# Patient Record
Sex: MALE | Race: WHITE | NOT HISPANIC OR LATINO | Employment: OTHER | URBAN - METROPOLITAN AREA
[De-identification: names, ages, dates, MRNs, and addresses within clinical notes are randomized per-mention and may not be internally consistent; named-entity substitution may affect disease eponyms.]

---

## 2023-10-31 ENCOUNTER — TELEPHONE (OUTPATIENT)
Dept: PRIMARY CARE | Facility: CLINIC | Age: 78
End: 2023-10-31
Payer: MEDICARE

## 2023-11-01 DIAGNOSIS — E78.5 HYPERLIPIDEMIA, UNSPECIFIED HYPERLIPIDEMIA TYPE: ICD-10-CM

## 2023-11-01 DIAGNOSIS — E11.9 TYPE 2 DIABETES MELLITUS WITHOUT COMPLICATION, UNSPECIFIED WHETHER LONG TERM INSULIN USE (MULTI): ICD-10-CM

## 2023-11-01 DIAGNOSIS — I10 HYPERTENSION, UNSPECIFIED TYPE: ICD-10-CM

## 2023-11-01 DIAGNOSIS — Z00.00 WELL ADULT EXAM: ICD-10-CM

## 2023-12-27 ENCOUNTER — PATIENT MESSAGE (OUTPATIENT)
Dept: PRIMARY CARE | Facility: CLINIC | Age: 78
End: 2023-12-27
Payer: MEDICARE

## 2023-12-27 DIAGNOSIS — I10 ESSENTIAL HYPERTENSION: ICD-10-CM

## 2023-12-27 DIAGNOSIS — E11.9 TYPE 2 DIABETES MELLITUS WITHOUT COMPLICATION, WITHOUT LONG-TERM CURRENT USE OF INSULIN (MULTI): Primary | ICD-10-CM

## 2024-01-10 ENCOUNTER — LAB (OUTPATIENT)
Dept: LAB | Facility: LAB | Age: 79
End: 2024-01-10
Payer: MEDICARE

## 2024-01-10 DIAGNOSIS — I10 HYPERTENSION, UNSPECIFIED TYPE: ICD-10-CM

## 2024-01-10 DIAGNOSIS — E11.9 TYPE 2 DIABETES MELLITUS WITHOUT COMPLICATION, UNSPECIFIED WHETHER LONG TERM INSULIN USE (MULTI): ICD-10-CM

## 2024-01-10 DIAGNOSIS — Z00.00 WELL ADULT EXAM: ICD-10-CM

## 2024-01-10 DIAGNOSIS — E78.5 HYPERLIPIDEMIA, UNSPECIFIED HYPERLIPIDEMIA TYPE: ICD-10-CM

## 2024-01-10 LAB
ALBUMIN SERPL-MCNC: 4.5 G/DL (ref 3.5–5)
ALP BLD-CCNC: 89 U/L (ref 35–125)
ALT SERPL-CCNC: 33 U/L (ref 5–40)
ANION GAP SERPL CALC-SCNC: 11 MMOL/L
APPEARANCE UR: CLEAR
AST SERPL-CCNC: 25 U/L (ref 5–40)
BASOPHILS # BLD AUTO: 0.05 X10*3/UL (ref 0–0.1)
BASOPHILS NFR BLD AUTO: 0.8 %
BILIRUB SERPL-MCNC: 0.5 MG/DL (ref 0.1–1.2)
BILIRUB UR STRIP.AUTO-MCNC: NEGATIVE MG/DL
BUN SERPL-MCNC: 22 MG/DL (ref 8–25)
CALCIUM SERPL-MCNC: 9.3 MG/DL (ref 8.5–10.4)
CHLORIDE SERPL-SCNC: 104 MMOL/L (ref 97–107)
CHOLEST SERPL-MCNC: 190 MG/DL (ref 133–200)
CHOLEST/HDLC SERPL: 3.9 {RATIO}
CO2 SERPL-SCNC: 27 MMOL/L (ref 24–31)
COLOR UR: NORMAL
CREAT SERPL-MCNC: 1.2 MG/DL (ref 0.4–1.6)
CREAT UR-MCNC: 173.8 MG/DL
EGFRCR SERPLBLD CKD-EPI 2021: 63 ML/MIN/1.73M*2
EOSINOPHIL # BLD AUTO: 0.17 X10*3/UL (ref 0–0.4)
EOSINOPHIL NFR BLD AUTO: 2.8 %
ERYTHROCYTE [DISTWIDTH] IN BLOOD BY AUTOMATED COUNT: 13.2 % (ref 11.5–14.5)
EST. AVERAGE GLUCOSE BLD GHB EST-MCNC: 157 MG/DL
GLUCOSE SERPL-MCNC: 144 MG/DL (ref 65–99)
GLUCOSE UR STRIP.AUTO-MCNC: NORMAL MG/DL
HBA1C MFR BLD: 7.1 %
HCT VFR BLD AUTO: 44.3 % (ref 41–52)
HDLC SERPL-MCNC: 49 MG/DL
HGB BLD-MCNC: 14.4 G/DL (ref 13.5–17.5)
IMM GRANULOCYTES # BLD AUTO: 0.01 X10*3/UL (ref 0–0.5)
IMM GRANULOCYTES NFR BLD AUTO: 0.2 % (ref 0–0.9)
KETONES UR STRIP.AUTO-MCNC: NEGATIVE MG/DL
LDLC SERPL CALC-MCNC: 110 MG/DL (ref 65–130)
LEUKOCYTE ESTERASE UR QL STRIP.AUTO: NEGATIVE
LYMPHOCYTES # BLD AUTO: 2.81 X10*3/UL (ref 0.8–3)
LYMPHOCYTES NFR BLD AUTO: 46.9 %
MCH RBC QN AUTO: 30.7 PG (ref 26–34)
MCHC RBC AUTO-ENTMCNC: 32.5 G/DL (ref 32–36)
MCV RBC AUTO: 95 FL (ref 80–100)
MICROALBUMIN UR-MCNC: 32 MG/L (ref 0–23)
MICROALBUMIN/CREAT UR: 18.4 UG/MG CREAT
MONOCYTES # BLD AUTO: 0.45 X10*3/UL (ref 0.05–0.8)
MONOCYTES NFR BLD AUTO: 7.5 %
NEUTROPHILS # BLD AUTO: 2.5 X10*3/UL (ref 1.6–5.5)
NEUTROPHILS NFR BLD AUTO: 41.8 %
NITRITE UR QL STRIP.AUTO: NEGATIVE
NRBC BLD-RTO: 0 /100 WBCS (ref 0–0)
PH UR STRIP.AUTO: 5 [PH]
PLATELET # BLD AUTO: 262 X10*3/UL (ref 150–450)
POTASSIUM SERPL-SCNC: 4.3 MMOL/L (ref 3.4–5.1)
PROT SERPL-MCNC: 6.3 G/DL (ref 5.9–7.9)
PROT UR STRIP.AUTO-MCNC: NEGATIVE MG/DL
RBC # BLD AUTO: 4.69 X10*6/UL (ref 4.5–5.9)
RBC # UR STRIP.AUTO: NEGATIVE /UL
SODIUM SERPL-SCNC: 142 MMOL/L (ref 133–145)
SP GR UR STRIP.AUTO: 1.02
TRIGL SERPL-MCNC: 157 MG/DL (ref 40–150)
UROBILINOGEN UR STRIP.AUTO-MCNC: NORMAL MG/DL
WBC # BLD AUTO: 6 X10*3/UL (ref 4.4–11.3)

## 2024-01-10 PROCEDURE — 81003 URINALYSIS AUTO W/O SCOPE: CPT

## 2024-01-10 PROCEDURE — 82570 ASSAY OF URINE CREATININE: CPT

## 2024-01-10 PROCEDURE — 83036 HEMOGLOBIN GLYCOSYLATED A1C: CPT

## 2024-01-10 PROCEDURE — 36415 COLL VENOUS BLD VENIPUNCTURE: CPT

## 2024-01-10 PROCEDURE — 82043 UR ALBUMIN QUANTITATIVE: CPT

## 2024-01-10 PROCEDURE — 85025 COMPLETE CBC W/AUTO DIFF WBC: CPT

## 2024-01-10 PROCEDURE — 80053 COMPREHEN METABOLIC PANEL: CPT

## 2024-01-10 PROCEDURE — 80061 LIPID PANEL: CPT

## 2024-01-16 ENCOUNTER — TELEPHONE (OUTPATIENT)
Dept: PRIMARY CARE | Facility: CLINIC | Age: 79
End: 2024-01-16

## 2024-01-16 ENCOUNTER — OFFICE VISIT (OUTPATIENT)
Dept: PRIMARY CARE | Facility: CLINIC | Age: 79
End: 2024-01-16
Payer: MEDICARE

## 2024-01-16 VITALS
HEART RATE: 72 BPM | OXYGEN SATURATION: 98 % | WEIGHT: 172 LBS | BODY MASS INDEX: 26.94 KG/M2 | DIASTOLIC BLOOD PRESSURE: 76 MMHG | SYSTOLIC BLOOD PRESSURE: 134 MMHG

## 2024-01-16 DIAGNOSIS — E11.9 TYPE 2 DIABETES MELLITUS WITHOUT COMPLICATION, WITHOUT LONG-TERM CURRENT USE OF INSULIN (MULTI): ICD-10-CM

## 2024-01-16 DIAGNOSIS — I10 ESSENTIAL HYPERTENSION: ICD-10-CM

## 2024-01-16 DIAGNOSIS — E78.00 HIGH CHOLESTEROL: ICD-10-CM

## 2024-01-16 DIAGNOSIS — E78.5 HYPERLIPIDEMIA, UNSPECIFIED HYPERLIPIDEMIA TYPE: ICD-10-CM

## 2024-01-16 PROCEDURE — 1036F TOBACCO NON-USER: CPT | Performed by: STUDENT IN AN ORGANIZED HEALTH CARE EDUCATION/TRAINING PROGRAM

## 2024-01-16 PROCEDURE — 90677 PCV20 VACCINE IM: CPT | Performed by: STUDENT IN AN ORGANIZED HEALTH CARE EDUCATION/TRAINING PROGRAM

## 2024-01-16 PROCEDURE — 3075F SYST BP GE 130 - 139MM HG: CPT | Performed by: STUDENT IN AN ORGANIZED HEALTH CARE EDUCATION/TRAINING PROGRAM

## 2024-01-16 PROCEDURE — 1159F MED LIST DOCD IN RCRD: CPT | Performed by: STUDENT IN AN ORGANIZED HEALTH CARE EDUCATION/TRAINING PROGRAM

## 2024-01-16 PROCEDURE — G0009 ADMIN PNEUMOCOCCAL VACCINE: HCPCS | Performed by: STUDENT IN AN ORGANIZED HEALTH CARE EDUCATION/TRAINING PROGRAM

## 2024-01-16 PROCEDURE — 99214 OFFICE O/P EST MOD 30 MIN: CPT | Performed by: STUDENT IN AN ORGANIZED HEALTH CARE EDUCATION/TRAINING PROGRAM

## 2024-01-16 PROCEDURE — 3078F DIAST BP <80 MM HG: CPT | Performed by: STUDENT IN AN ORGANIZED HEALTH CARE EDUCATION/TRAINING PROGRAM

## 2024-01-16 PROCEDURE — 1126F AMNT PAIN NOTED NONE PRSNT: CPT | Performed by: STUDENT IN AN ORGANIZED HEALTH CARE EDUCATION/TRAINING PROGRAM

## 2024-01-16 RX ORDER — LISINOPRIL 40 MG/1
TABLET ORAL EVERY 24 HOURS
COMMUNITY
Start: 2013-06-01 | End: 2024-01-16 | Stop reason: SDUPTHER

## 2024-01-16 RX ORDER — HYDROCHLOROTHIAZIDE 25 MG/1
25 TABLET ORAL DAILY
Qty: 90 TABLET | Refills: 2 | Status: SHIPPED | OUTPATIENT
Start: 2024-01-16

## 2024-01-16 RX ORDER — PRAVASTATIN SODIUM 40 MG/1
TABLET ORAL
COMMUNITY
Start: 2013-06-01 | End: 2024-01-16 | Stop reason: SDUPTHER

## 2024-01-16 RX ORDER — GLIMEPIRIDE 2 MG/1
TABLET ORAL EVERY 24 HOURS
COMMUNITY
Start: 2013-06-01 | End: 2024-01-16 | Stop reason: SDUPTHER

## 2024-01-16 RX ORDER — GLIMEPIRIDE 2 MG/1
2 TABLET ORAL EVERY 24 HOURS
Qty: 90 TABLET | Refills: 2 | Status: SHIPPED | OUTPATIENT
Start: 2024-01-16

## 2024-01-16 RX ORDER — HYDROCHLOROTHIAZIDE 25 MG/1
25 TABLET ORAL DAILY
COMMUNITY
End: 2024-01-16 | Stop reason: SDUPTHER

## 2024-01-16 RX ORDER — PRAVASTATIN SODIUM 40 MG/1
TABLET ORAL
Qty: 90 TABLET | Refills: 2 | Status: SHIPPED | OUTPATIENT
Start: 2024-01-16

## 2024-01-16 RX ORDER — LISINOPRIL 40 MG/1
40 TABLET ORAL EVERY 24 HOURS
Qty: 90 TABLET | Refills: 2 | Status: SHIPPED | OUTPATIENT
Start: 2024-01-16

## 2024-01-16 ASSESSMENT — PAIN SCALES - GENERAL: PAINLEVEL: 0-NO PAIN

## 2024-01-16 ASSESSMENT — ENCOUNTER SYMPTOMS
DIZZINESS: 0
TROUBLE SWALLOWING: 0
CONSTIPATION: 0
CHILLS: 0
HEADACHES: 0
ARTHRALGIAS: 0
BACK PAIN: 0
WHEEZING: 0
FEVER: 0
BLOOD IN STOOL: 0
NERVOUS/ANXIOUS: 0
LIGHT-HEADEDNESS: 0
ABDOMINAL PAIN: 0
DIFFICULTY URINATING: 0
DIARRHEA: 0
PALPITATIONS: 0
DYSPHORIC MOOD: 0
COUGH: 0
JOINT SWELLING: 0
SHORTNESS OF BREATH: 0

## 2024-01-16 ASSESSMENT — PATIENT HEALTH QUESTIONNAIRE - PHQ9
2. FEELING DOWN, DEPRESSED OR HOPELESS: NOT AT ALL
1. LITTLE INTEREST OR PLEASURE IN DOING THINGS: NOT AT ALL
SUM OF ALL RESPONSES TO PHQ9 QUESTIONS 1 AND 2: 0

## 2024-01-16 NOTE — PROGRESS NOTES
GENERAL PROGRESS NOTE    Chief Complaint   Patient presents with    Diabetes       HPI  Jad Fisher is a 78 y.o. male that presents today for chronic disease management. Prior pt of Olivia Mason, this is our first meeting.     INTERVAL HX/CURRENT CONCERNS:  None    CHRONIC CONDITIONS:  DM  HTN  HLD - Pravastatin 40mg,     #HTN ck  BP today - 140/84, repeat  HCTZ 25, lisinopril 40      #DM ck  A1c - 7.1, 6.5, 7.1, 6.9, 6.4, 6.5, 6.7, 8.2, 7.8 6.5 6.5, 6.6  Was not watching what he was eating over holidays is getting more on track   Current treatment:  - Glimepiride 2mg  Home blood sugars - not checking  No hypoglycemia ever   Previous diarrhea with both IR and ER versions of metformin   eGFR - 63, 63, 57, 70, 63 70, 78 88, 78  Cr - 1.2, 1.2, 1.3, 1.1, 1.2, 1.1, 1.0, 0.9, 1.0   Last microalbumin - 6/23   Last eye exam - 10/23    Last foot exam - today  Pneumovax done? - 2015    Health Maintenance  Immunizations:     Tdap -     Pneumococcal - PCV13 2015, PPSV23 2020 per prior PCP notes, not documented in chart.  PCV 20 -- IF WILLING    Shingles - x2    COVID - 11/2023    Influenza - 11/2023  Colonoscopy: 2018, screening discontinue for age  Lung cancer screening: not indicated, never smoker  PSA - discontinued for age  AAA screen: never smoker      Vital signs   /76   Pulse 72   Wt 78 kg (172 lb)   SpO2 98%   BMI 26.94 kg/m²      Current Outpatient Medications   Medication Sig Dispense Refill    cinn-chrom-ALA-gym-ginseng-tea 100 mg-100 mcg- 100 mg-100 mg capsule daily      glimepiride (Amaryl) 2 mg tablet once every 24 hours.      hydroCHLOROthiazide (HYDRODiuril) 25 mg tablet Take 1 tablet (25 mg) by mouth once daily.      lisinopril 40 mg tablet once every 24 hours.      multivit with minerals/lutein (MULTIVITAMIN 50 PLUS ORAL) once every 24 hours.      pravastatin (Pravachol) 40  mg tablet 1 tab(s)       No current facility-administered medications for this visit.       Review of Systems   Constitutional:  Negative for chills and fever.   HENT:  Negative for trouble swallowing.    Eyes:  Negative for visual disturbance.   Respiratory:  Negative for cough, shortness of breath and wheezing.    Cardiovascular:  Negative for chest pain and palpitations.   Gastrointestinal:  Negative for abdominal pain, blood in stool, constipation and diarrhea.   Genitourinary:  Negative for difficulty urinating, penile pain and testicular pain.   Musculoskeletal:  Negative for arthralgias, back pain and joint swelling.   Skin:  Negative for rash.   Neurological:  Negative for dizziness, light-headedness and headaches.   Psychiatric/Behavioral:  Negative for dysphoric mood. The patient is not nervous/anxious.         Physical Exam  Constitutional:       Appearance: Normal appearance.   Cardiovascular:      Rate and Rhythm: Normal rate and regular rhythm.      Heart sounds: Normal heart sounds. No murmur heard.  Pulmonary:      Effort: Pulmonary effort is normal.      Breath sounds: Normal breath sounds. No wheezing, rhonchi or rales.   Musculoskeletal:      Right lower leg: No edema.      Left lower leg: No edema.   Neurological:      Mental Status: He is alert.       DIABETIC FOOT EXAM:  Skin changes - None  Pulses Present bilaterally to DP and PT  Nails - No fungal infections  Sensation in tact throughout with monifilament  No skin breaks between toes  No ulcers      Assessment/Plan   1. Essential hypertension  Well controlled on current meds, no indication to change any meds at this time  Stable renal function  Repeat in 6mo  - hydroCHLOROthiazide (HYDRODiuril) 25 mg tablet; Take 1 tablet (25 mg) by mouth once daily.  Dispense: 90 tablet; Refill: 2  - lisinopril 40 mg tablet; Take 1 tablet (40 mg) by mouth once every 24 hours.  Dispense: 90 tablet; Refill: 2    2. Type 2 diabetes mellitus without  complication, without long-term current use of insulin (CMS/[facility])  Labs reviewed. Increase in A1c from prior in setting of dietary noncompliance. Will increase his efforts. No medication changes today. Follow up 4 mos  - glimepiride (Amaryl) 2 mg tablet; Take 1 tablet (2 mg) by mouth once every 24 hours.  Dispense: 90 tablet; Refill: 2    3. High cholesterol  Lipid panel reviewed. Continue medication. Low fat diet. Regular exercise.  - pravastatin (Pravachol) 40 mg tablet; 1 tab(s)  Dispense: 90 tablet; Refill: 2      Dolores Alexis MD  01/16/24  9:07 AM

## 2024-06-03 ENCOUNTER — LAB (OUTPATIENT)
Dept: LAB | Facility: LAB | Age: 79
End: 2024-06-03
Payer: MEDICARE

## 2024-06-03 DIAGNOSIS — I10 ESSENTIAL HYPERTENSION: ICD-10-CM

## 2024-06-03 DIAGNOSIS — E11.9 TYPE 2 DIABETES MELLITUS WITHOUT COMPLICATION, WITHOUT LONG-TERM CURRENT USE OF INSULIN (MULTI): ICD-10-CM

## 2024-06-03 DIAGNOSIS — E78.5 HYPERLIPIDEMIA, UNSPECIFIED HYPERLIPIDEMIA TYPE: ICD-10-CM

## 2024-06-03 LAB
ALBUMIN SERPL-MCNC: 4.6 G/DL (ref 3.5–5)
ALP BLD-CCNC: 97 U/L (ref 35–125)
ALT SERPL-CCNC: 23 U/L (ref 5–40)
ANION GAP SERPL CALC-SCNC: 11 MMOL/L
APPEARANCE UR: CLEAR
AST SERPL-CCNC: 22 U/L (ref 5–40)
BASOPHILS # BLD AUTO: 0.04 X10*3/UL (ref 0–0.1)
BASOPHILS NFR BLD AUTO: 0.6 %
BILIRUB SERPL-MCNC: 0.3 MG/DL (ref 0.1–1.2)
BILIRUB UR STRIP.AUTO-MCNC: NEGATIVE MG/DL
BUN SERPL-MCNC: 24 MG/DL (ref 8–25)
CALCIUM SERPL-MCNC: 9.6 MG/DL (ref 8.5–10.4)
CHLORIDE SERPL-SCNC: 104 MMOL/L (ref 97–107)
CHOLEST SERPL-MCNC: 183 MG/DL (ref 133–200)
CHOLEST/HDLC SERPL: 3.4 {RATIO}
CO2 SERPL-SCNC: 26 MMOL/L (ref 24–31)
COLOR UR: NORMAL
CREAT SERPL-MCNC: 1.1 MG/DL (ref 0.4–1.6)
EGFRCR SERPLBLD CKD-EPI 2021: 69 ML/MIN/1.73M*2
EOSINOPHIL # BLD AUTO: 0.14 X10*3/UL (ref 0–0.4)
EOSINOPHIL NFR BLD AUTO: 2.2 %
ERYTHROCYTE [DISTWIDTH] IN BLOOD BY AUTOMATED COUNT: 12.6 % (ref 11.5–14.5)
EST. AVERAGE GLUCOSE BLD GHB EST-MCNC: 143 MG/DL
GLUCOSE SERPL-MCNC: 171 MG/DL (ref 65–99)
GLUCOSE UR STRIP.AUTO-MCNC: NORMAL MG/DL
HBA1C MFR BLD: 6.6 %
HCT VFR BLD AUTO: 42.6 % (ref 41–52)
HDLC SERPL-MCNC: 54 MG/DL
HGB BLD-MCNC: 14 G/DL (ref 13.5–17.5)
IMM GRANULOCYTES # BLD AUTO: 0.02 X10*3/UL (ref 0–0.5)
IMM GRANULOCYTES NFR BLD AUTO: 0.3 % (ref 0–0.9)
KETONES UR STRIP.AUTO-MCNC: NEGATIVE MG/DL
LDLC SERPL CALC-MCNC: 96 MG/DL (ref 65–130)
LEUKOCYTE ESTERASE UR QL STRIP.AUTO: NEGATIVE
LYMPHOCYTES # BLD AUTO: 2 X10*3/UL (ref 0.8–3)
LYMPHOCYTES NFR BLD AUTO: 31.1 %
MCH RBC QN AUTO: 30.6 PG (ref 26–34)
MCHC RBC AUTO-ENTMCNC: 32.9 G/DL (ref 32–36)
MCV RBC AUTO: 93 FL (ref 80–100)
MONOCYTES # BLD AUTO: 0.47 X10*3/UL (ref 0.05–0.8)
MONOCYTES NFR BLD AUTO: 7.3 %
NEUTROPHILS # BLD AUTO: 3.77 X10*3/UL (ref 1.6–5.5)
NEUTROPHILS NFR BLD AUTO: 58.5 %
NITRITE UR QL STRIP.AUTO: NEGATIVE
NRBC BLD-RTO: 0 /100 WBCS (ref 0–0)
PH UR STRIP.AUTO: 6 [PH]
PLATELET # BLD AUTO: 260 X10*3/UL (ref 150–450)
POTASSIUM SERPL-SCNC: 4.6 MMOL/L (ref 3.4–5.1)
PROT SERPL-MCNC: 6.6 G/DL (ref 5.9–7.9)
PROT UR STRIP.AUTO-MCNC: NEGATIVE MG/DL
RBC # BLD AUTO: 4.57 X10*6/UL (ref 4.5–5.9)
RBC # UR STRIP.AUTO: NEGATIVE /UL
SODIUM SERPL-SCNC: 141 MMOL/L (ref 133–145)
SP GR UR STRIP.AUTO: 1.02
TRIGL SERPL-MCNC: 167 MG/DL (ref 40–150)
UROBILINOGEN UR STRIP.AUTO-MCNC: NORMAL MG/DL
WBC # BLD AUTO: 6.4 X10*3/UL (ref 4.4–11.3)

## 2024-06-03 PROCEDURE — 80053 COMPREHEN METABOLIC PANEL: CPT

## 2024-06-03 PROCEDURE — 83036 HEMOGLOBIN GLYCOSYLATED A1C: CPT

## 2024-06-03 PROCEDURE — 36415 COLL VENOUS BLD VENIPUNCTURE: CPT

## 2024-06-03 PROCEDURE — 85025 COMPLETE CBC W/AUTO DIFF WBC: CPT

## 2024-06-03 PROCEDURE — 81003 URINALYSIS AUTO W/O SCOPE: CPT

## 2024-06-03 PROCEDURE — 80061 LIPID PANEL: CPT

## 2024-06-10 ENCOUNTER — TELEPHONE (OUTPATIENT)
Dept: PRIMARY CARE | Facility: CLINIC | Age: 79
End: 2024-06-10

## 2024-06-10 ENCOUNTER — OFFICE VISIT (OUTPATIENT)
Dept: PRIMARY CARE | Facility: CLINIC | Age: 79
End: 2024-06-10
Payer: MEDICARE

## 2024-06-10 VITALS
HEIGHT: 67 IN | TEMPERATURE: 97.7 F | SYSTOLIC BLOOD PRESSURE: 140 MMHG | HEART RATE: 61 BPM | DIASTOLIC BLOOD PRESSURE: 82 MMHG | WEIGHT: 168 LBS | OXYGEN SATURATION: 96 % | BODY MASS INDEX: 26.37 KG/M2

## 2024-06-10 DIAGNOSIS — Z00.00 ROUTINE GENERAL MEDICAL EXAMINATION AT A HEALTH CARE FACILITY: Primary | ICD-10-CM

## 2024-06-10 DIAGNOSIS — Z85.46 HISTORY OF PROSTATE CANCER: ICD-10-CM

## 2024-06-10 DIAGNOSIS — E11.9 TYPE 2 DIABETES MELLITUS WITHOUT COMPLICATION, WITHOUT LONG-TERM CURRENT USE OF INSULIN (MULTI): ICD-10-CM

## 2024-06-10 DIAGNOSIS — E78.5 HYPERLIPIDEMIA, UNSPECIFIED HYPERLIPIDEMIA TYPE: ICD-10-CM

## 2024-06-10 DIAGNOSIS — I10 ESSENTIAL HYPERTENSION: ICD-10-CM

## 2024-06-10 PROCEDURE — 3077F SYST BP >= 140 MM HG: CPT | Performed by: STUDENT IN AN ORGANIZED HEALTH CARE EDUCATION/TRAINING PROGRAM

## 2024-06-10 PROCEDURE — 93000 ELECTROCARDIOGRAM COMPLETE: CPT | Performed by: STUDENT IN AN ORGANIZED HEALTH CARE EDUCATION/TRAINING PROGRAM

## 2024-06-10 PROCEDURE — 1158F ADVNC CARE PLAN TLK DOCD: CPT | Performed by: STUDENT IN AN ORGANIZED HEALTH CARE EDUCATION/TRAINING PROGRAM

## 2024-06-10 PROCEDURE — 1123F ACP DISCUSS/DSCN MKR DOCD: CPT | Performed by: STUDENT IN AN ORGANIZED HEALTH CARE EDUCATION/TRAINING PROGRAM

## 2024-06-10 PROCEDURE — 3078F DIAST BP <80 MM HG: CPT | Performed by: STUDENT IN AN ORGANIZED HEALTH CARE EDUCATION/TRAINING PROGRAM

## 2024-06-10 PROCEDURE — 99214 OFFICE O/P EST MOD 30 MIN: CPT | Performed by: STUDENT IN AN ORGANIZED HEALTH CARE EDUCATION/TRAINING PROGRAM

## 2024-06-10 PROCEDURE — 1160F RVW MEDS BY RX/DR IN RCRD: CPT | Performed by: STUDENT IN AN ORGANIZED HEALTH CARE EDUCATION/TRAINING PROGRAM

## 2024-06-10 PROCEDURE — 1036F TOBACCO NON-USER: CPT | Performed by: STUDENT IN AN ORGANIZED HEALTH CARE EDUCATION/TRAINING PROGRAM

## 2024-06-10 PROCEDURE — G0439 PPPS, SUBSEQ VISIT: HCPCS | Performed by: STUDENT IN AN ORGANIZED HEALTH CARE EDUCATION/TRAINING PROGRAM

## 2024-06-10 PROCEDURE — 1170F FXNL STATUS ASSESSED: CPT | Performed by: STUDENT IN AN ORGANIZED HEALTH CARE EDUCATION/TRAINING PROGRAM

## 2024-06-10 PROCEDURE — 1159F MED LIST DOCD IN RCRD: CPT | Performed by: STUDENT IN AN ORGANIZED HEALTH CARE EDUCATION/TRAINING PROGRAM

## 2024-06-10 ASSESSMENT — ENCOUNTER SYMPTOMS
CHILLS: 0
OCCASIONAL FEELINGS OF UNSTEADINESS: 0
JOINT SWELLING: 0
NERVOUS/ANXIOUS: 0
LIGHT-HEADEDNESS: 0
ABDOMINAL PAIN: 0
ARTHRALGIAS: 0
PALPITATIONS: 0
DIFFICULTY URINATING: 0
FEVER: 0
LOSS OF SENSATION IN FEET: 0
HEADACHES: 0
DIZZINESS: 0
CONSTIPATION: 0
TROUBLE SWALLOWING: 0
WHEEZING: 0
DIARRHEA: 0
BACK PAIN: 0
COUGH: 0
SHORTNESS OF BREATH: 0
DYSPHORIC MOOD: 0
BLOOD IN STOOL: 0
DEPRESSION: 0

## 2024-06-10 ASSESSMENT — ACTIVITIES OF DAILY LIVING (ADL)
BATHING: INDEPENDENT
MANAGING_FINANCES: INDEPENDENT
GROCERY_SHOPPING: INDEPENDENT
DRESSING: INDEPENDENT
DOING_HOUSEWORK: INDEPENDENT
TAKING_MEDICATION: INDEPENDENT

## 2024-06-10 ASSESSMENT — PATIENT HEALTH QUESTIONNAIRE - PHQ9
1. LITTLE INTEREST OR PLEASURE IN DOING THINGS: NOT AT ALL
2. FEELING DOWN, DEPRESSED OR HOPELESS: NOT AT ALL
SUM OF ALL RESPONSES TO PHQ9 QUESTIONS 1 AND 2: 0

## 2024-06-10 NOTE — PROGRESS NOTES
Subjective   Reason for Visit: Jad Fisher is an 79 y.o. male here for a Medicare Wellness visit.          Reviewed all medications by prescribing practitioner or clinical pharmacist (such as prescriptions, OTCs, herbal therapies and supplements) and documented in the medical record.    HPI  INTERVAL HX/CURRENT CONCERNS: none    CHRONIC CONDITIONS:  DM  HTN  HLD - Pravastatin 40mg, LDL 96  Hx prostate ca s/p brachytherapy 2017, under urology. Will be establishing with new urologist 7/2024 due to Dr. Miranda FDC    Supplements - MVI, cinnamon, fish oil    SPECIALISTS -   Urology - Dr. Miranda       Exercise - not really aside from housework, manages two households   Diet - good  Retired -   EtOH - one drink every 2-3 days    Current Outpatient Medications   Medication Instructions    cinn-chrom-ALA-gym-ginseng-tea 100 mg-100 mcg- 100 mg-100 mg capsule daily    glimepiride (AMARYL) 2 mg, oral, Every 24 hours    hydroCHLOROthiazide (HYDRODIURIL) 25 mg, oral, Daily    lisinopril 40 mg, oral, Every 24 hours    multivit with minerals/lutein (MULTIVITAMIN 50 PLUS ORAL) Every 24 hours    pravastatin (Pravachol) 40 mg tablet 1 tab(s)     Allergies   Allergen Reactions    Sildenafil Unknown       Immunization History   Administered Date(s) Administered    Flu vaccine, quadrivalent, high-dose, preservative free, age 65y+ (FLUZONE) 10/08/2020, 10/26/2021, 11/08/2022    Influenza, High Dose Seasonal, Preservative Free 11/02/2015, 10/14/2016, 09/05/2017, 10/17/2018    Influenza, Seasonal, Quadrivalent, Adjuvanted 11/08/2023    Influenza, injectable, quadrivalent 10/16/2019    Influenza, seasonal, injectable 10/01/2016    Influenza, seasonal, injectable, preservative free 10/21/2011, 10/01/2014, 10/01/2015    Moderna SARS-CoV-2 Vaccination 02/18/2021, 03/17/2021, 11/09/2021    Pfizer COVID-19 vaccine, Fall 2023, 12 years and older, (30mcg/0.3mL) 11/08/2023    Pfizer COVID-19 vaccine, bivalent, age 12 years and  older (30 mcg/0.3 mL) 11/16/2022    Pneumococcal conjugate vaccine, 13-valent (PREVNAR 13) 03/02/2015, 01/01/2018    Pneumococcal conjugate vaccine, 20-valent (PREVNAR 20) 01/16/2024    Pneumococcal polysaccharide vaccine, 23-valent, age 2 years and older (PNEUMOVAX 23) 03/26/2012    Tdap vaccine, age 7 year and older (BOOSTRIX, ADACEL) 11/07/2008, 01/01/2016    Zoster, live 01/01/2014, 06/11/2014     Past Surgical History:   Procedure Laterality Date    CARPAL TUNNEL RELEASE  04/04/2017    Neuroplasty Decompression Median Nerve At Carpal Tunnel    COLONOSCOPY  04/16/2019    Complete Colonoscopy    OTHER SURGICAL HISTORY  04/09/2018    Needle/Cath Placement For Brachyther Applic In Pelvic Organs    OTHER SURGICAL HISTORY  03/24/2014    Open Treat Weight Bearing Distal Tibial & Fibular Fracture    TONSILLECTOMY  03/24/2014    Tonsillectomy     Family History   Problem Relation Name Age of Onset    Cancer Mother      Diabetes Father      Heart disease Father      Other (high blood pressure) Father      Hyperlipidemia Father       Social History     Socioeconomic History    Marital status:      Spouse name: None    Number of children: None    Years of education: None    Highest education level: None   Occupational History    None   Tobacco Use    Smoking status: Never    Smokeless tobacco: Never   Vaping Use    Vaping status: Never Used   Substance and Sexual Activity    Alcohol use: Yes    Drug use: Never    Sexual activity: None   Other Topics Concern    None   Social History Narrative    None     Social Determinants of Health     Financial Resource Strain: Not on file   Food Insecurity: Not on file   Transportation Needs: Not on file   Physical Activity: Not on file   Stress: Not on file   Social Connections: Not on file   Intimate Partner Violence: Not on file   Housing Stability: Not on file         #HTN   BP today - 144/75  HCTZ 25, lisinopril 40  Home readings - 130-140/70's. Not checking often      #DM  "  A1c - 6.6, 7.1, 6.5, 7.1, 6.9, 6.4, 6.5, 6.7, 8.2, 7.8 6.5 6.5, 6.6   Current treatment:  - Glimepiride 2mg daily  Home blood sugars - not checking regularly. Has checked in past week, and non-fasting is 130-140's  No hypoglycemia ever   Previous diarrhea with both IR and ER versions of metformin   eGFR - 69, 63, 63, 57, 70, 63 70, 78 88, 78  Cr - 1.1, 1.2, 1.2, 1.3, 1.1, 1.2, 1.1, 1.0, 0.9, 1.0   Last microalbumin - 1/24   Last eye exam - 10/23    Last foot exam - 1/24      Health Maintenance  Immunizations:     Tdap - 2016    Pneumococcal - PCV20 1/24    Shingles - Zostavax 2014, shingrix done x 2 at [facility] per pt    COVID - last dose 11/2023    Influenza - annually  Colonoscopy: 2018, now discontinued for age  Lung cancer screening: not indicated    Male Specific   PSA last done - 7/23 with urology w/ hx prostate ca   AAA screen: never smoker    Patient Care Team:  Dolores Alexis MD as PCP - General (Family Medicine)     Review of Systems   Constitutional:  Negative for chills and fever.   HENT:  Negative for trouble swallowing.    Eyes:  Negative for visual disturbance.   Respiratory:  Negative for cough, shortness of breath and wheezing.    Cardiovascular:  Negative for chest pain and palpitations.   Gastrointestinal:  Negative for abdominal pain, blood in stool, constipation and diarrhea.   Genitourinary:  Negative for difficulty urinating, penile pain and testicular pain.   Musculoskeletal:  Negative for arthralgias, back pain and joint swelling.   Skin:  Negative for rash.   Neurological:  Negative for dizziness, light-headedness and headaches.   Psychiatric/Behavioral:  Negative for dysphoric mood. The patient is not nervous/anxious.        Objective   Vitals:  /75 (BP Location: Left arm, Patient Position: Sitting, BP Cuff Size: Adult)   Pulse 61   Temp 36.5 °C (97.7 °F) (Temporal)   Ht 1.702 m (5' 7\")   Wt 76.2 kg (168 lb)   SpO2 96%   BMI 26.31 kg/m²       Physical Exam  Vitals and nursing note " reviewed.   Constitutional:       Appearance: Normal appearance.   HENT:      Head: Normocephalic and atraumatic.      Right Ear: Tympanic membrane, ear canal and external ear normal.      Left Ear: Tympanic membrane, ear canal and external ear normal.      Mouth/Throat:      Mouth: Mucous membranes are moist.      Pharynx: Oropharynx is clear.   Eyes:      Extraocular Movements: Extraocular movements intact.      Conjunctiva/sclera: Conjunctivae normal.      Pupils: Pupils are equal, round, and reactive to light.   Cardiovascular:      Rate and Rhythm: Normal rate and regular rhythm.      Heart sounds: Normal heart sounds. No murmur heard.  Pulmonary:      Effort: Pulmonary effort is normal.      Breath sounds: Normal breath sounds. No wheezing, rhonchi or rales.   Abdominal:      General: Abdomen is flat.      Palpations: Abdomen is soft.      Tenderness: There is no abdominal tenderness.   Musculoskeletal:         General: Normal range of motion.      Cervical back: Normal range of motion and neck supple.   Skin:     General: Skin is warm and dry.   Neurological:      Mental Status: He is alert.   Psychiatric:         Mood and Affect: Mood normal.         Behavior: Behavior normal.         Assessment/Plan   1. Routine general medical examination at a health care facility  Well adult exam.  1. Age appropriate preventative measures reviewed.   2. Encouraged healthy diet and exercise.  3. Immunizations- Reviewed and discussed  4. Labs- Reviewed and discussed with patient  5. Medications- Reviewed  - ECG 12 Lead    2. Essential hypertension  Slight elevation today. Begin checking BP at home 2-3x weekly. He will report any persistently elevated readings. No medication changes made today. Discussed DASH diet, regular exercise. Follow up 4 mos.     3. Type 2 diabetes mellitus without complication, without long-term current use of insulin (Multi)  Controlled  Continue current medication regimen.   Patient is on ACE, and  statin  Lipid panel checked in last year  Foot exam and monofilament UTD  Routine diabetic retinopathy screening up to date   Continue dietary efforts and physical activity      4. Hyperlipidemia, unspecified hyperlipidemia type  Lipid panel reviewed. Continue medication. Low fat diet. Regular exercise.    5. History of prostate cancer  Follow up with urology as scheduled

## 2024-10-10 ENCOUNTER — LAB (OUTPATIENT)
Dept: LAB | Facility: LAB | Age: 79
End: 2024-10-10
Payer: MEDICARE

## 2024-10-10 DIAGNOSIS — E11.9 TYPE 2 DIABETES MELLITUS WITHOUT COMPLICATION, WITHOUT LONG-TERM CURRENT USE OF INSULIN (MULTI): ICD-10-CM

## 2024-10-10 DIAGNOSIS — I10 ESSENTIAL HYPERTENSION: ICD-10-CM

## 2024-10-10 LAB
ALBUMIN SERPL BCP-MCNC: 4.5 G/DL (ref 3.4–5)
ALP SERPL-CCNC: 76 U/L (ref 33–136)
ALT SERPL W P-5'-P-CCNC: 21 U/L (ref 10–52)
ANION GAP SERPL CALCULATED.3IONS-SCNC: 9 MMOL/L (ref 10–20)
APPEARANCE UR: CLEAR
AST SERPL W P-5'-P-CCNC: 23 U/L (ref 9–39)
BILIRUB SERPL-MCNC: 0.5 MG/DL (ref 0–1.2)
BILIRUB UR STRIP.AUTO-MCNC: NEGATIVE MG/DL
BUN SERPL-MCNC: 17 MG/DL (ref 6–23)
CALCIUM SERPL-MCNC: 9.7 MG/DL (ref 8.6–10.3)
CHLORIDE SERPL-SCNC: 106 MMOL/L (ref 98–107)
CO2 SERPL-SCNC: 30 MMOL/L (ref 21–32)
COLOR UR: NORMAL
CREAT SERPL-MCNC: 1.01 MG/DL (ref 0.5–1.3)
CREAT UR-MCNC: 88 MG/DL (ref 20–370)
EGFRCR SERPLBLD CKD-EPI 2021: 77 ML/MIN/1.73M*2
GLUCOSE SERPL-MCNC: 86 MG/DL (ref 74–99)
GLUCOSE UR STRIP.AUTO-MCNC: NORMAL MG/DL
KETONES UR STRIP.AUTO-MCNC: NEGATIVE MG/DL
LEUKOCYTE ESTERASE UR QL STRIP.AUTO: NEGATIVE
MICROALBUMIN UR-MCNC: <7 MG/L
MICROALBUMIN/CREAT UR: NORMAL MG/G{CREAT}
NITRITE UR QL STRIP.AUTO: NEGATIVE
PH UR STRIP.AUTO: 6 [PH]
POTASSIUM SERPL-SCNC: 4.2 MMOL/L (ref 3.5–5.3)
PROT SERPL-MCNC: 6.4 G/DL (ref 6.4–8.2)
PROT UR STRIP.AUTO-MCNC: NEGATIVE MG/DL
RBC # UR STRIP.AUTO: NEGATIVE /UL
SODIUM SERPL-SCNC: 141 MMOL/L (ref 136–145)
SP GR UR STRIP.AUTO: 1.02
UROBILINOGEN UR STRIP.AUTO-MCNC: NORMAL MG/DL

## 2024-10-10 PROCEDURE — 82043 UR ALBUMIN QUANTITATIVE: CPT

## 2024-10-10 PROCEDURE — 83036 HEMOGLOBIN GLYCOSYLATED A1C: CPT

## 2024-10-10 PROCEDURE — 81003 URINALYSIS AUTO W/O SCOPE: CPT

## 2024-10-10 PROCEDURE — 82570 ASSAY OF URINE CREATININE: CPT

## 2024-10-10 PROCEDURE — 80053 COMPREHEN METABOLIC PANEL: CPT

## 2024-10-11 LAB
EST. AVERAGE GLUCOSE BLD GHB EST-MCNC: 126 MG/DL
HBA1C MFR BLD: 6 %

## 2024-10-17 ENCOUNTER — TELEPHONE (OUTPATIENT)
Dept: PRIMARY CARE | Facility: CLINIC | Age: 79
End: 2024-10-17

## 2024-10-17 ENCOUNTER — APPOINTMENT (OUTPATIENT)
Dept: PRIMARY CARE | Facility: CLINIC | Age: 79
End: 2024-10-17
Payer: MEDICARE

## 2024-10-17 VITALS
OXYGEN SATURATION: 98 % | WEIGHT: 167 LBS | BODY MASS INDEX: 26.21 KG/M2 | DIASTOLIC BLOOD PRESSURE: 80 MMHG | HEART RATE: 65 BPM | HEIGHT: 67 IN | SYSTOLIC BLOOD PRESSURE: 142 MMHG

## 2024-10-17 DIAGNOSIS — E11.9 TYPE 2 DIABETES MELLITUS WITHOUT COMPLICATION, WITHOUT LONG-TERM CURRENT USE OF INSULIN (MULTI): ICD-10-CM

## 2024-10-17 DIAGNOSIS — E78.00 HIGH CHOLESTEROL: ICD-10-CM

## 2024-10-17 DIAGNOSIS — I10 ESSENTIAL HYPERTENSION: ICD-10-CM

## 2024-10-17 DIAGNOSIS — I10 ESSENTIAL HYPERTENSION: Primary | ICD-10-CM

## 2024-10-17 PROBLEM — F43.21 GRIEF REACTION: Status: ACTIVE | Noted: 2020-07-23

## 2024-10-17 PROBLEM — R42 DIZZINESS: Status: ACTIVE | Noted: 2020-09-03

## 2024-10-17 PROBLEM — G56.03 SEVERE CARPAL TUNNEL SYNDROME OF BOTH WRISTS: Status: ACTIVE | Noted: 2024-10-17

## 2024-10-17 PROBLEM — G56.22 CUBITAL TUNNEL SYNDROME ON LEFT: Status: ACTIVE | Noted: 2024-10-17

## 2024-10-17 PROBLEM — N52.9 MALE ERECTILE DISORDER: Status: ACTIVE | Noted: 2024-10-17

## 2024-10-17 PROBLEM — N40.0 BENIGN PROSTATIC HYPERPLASIA: Status: ACTIVE | Noted: 2024-10-17

## 2024-10-17 PROBLEM — Z86.0101 HISTORY OF ADENOMATOUS POLYP OF COLON: Status: ACTIVE | Noted: 2024-10-17

## 2024-10-17 PROBLEM — R33.9 RETENTION OF URINE: Status: ACTIVE | Noted: 2024-10-17

## 2024-10-17 PROBLEM — R19.7 DIARRHEA: Status: RESOLVED | Noted: 2020-09-03 | Resolved: 2024-10-17

## 2024-10-17 PROBLEM — E78.5 HYPERLIPIDEMIA: Status: ACTIVE | Noted: 2020-09-03

## 2024-10-17 PROBLEM — C61 ADENOCARCINOMA OF PROSTATE (MULTI): Status: ACTIVE | Noted: 2019-08-06

## 2024-10-17 PROBLEM — F43.20 GRIEF REACTION: Status: ACTIVE | Noted: 2020-07-23

## 2024-10-17 PROBLEM — R20.2 PARESTHESIA OF SKIN: Status: RESOLVED | Noted: 2024-10-17 | Resolved: 2024-10-17

## 2024-10-17 PROCEDURE — 1159F MED LIST DOCD IN RCRD: CPT | Performed by: STUDENT IN AN ORGANIZED HEALTH CARE EDUCATION/TRAINING PROGRAM

## 2024-10-17 PROCEDURE — 99214 OFFICE O/P EST MOD 30 MIN: CPT | Performed by: STUDENT IN AN ORGANIZED HEALTH CARE EDUCATION/TRAINING PROGRAM

## 2024-10-17 PROCEDURE — 1123F ACP DISCUSS/DSCN MKR DOCD: CPT | Performed by: STUDENT IN AN ORGANIZED HEALTH CARE EDUCATION/TRAINING PROGRAM

## 2024-10-17 PROCEDURE — 1036F TOBACCO NON-USER: CPT | Performed by: STUDENT IN AN ORGANIZED HEALTH CARE EDUCATION/TRAINING PROGRAM

## 2024-10-17 PROCEDURE — 3077F SYST BP >= 140 MM HG: CPT | Performed by: STUDENT IN AN ORGANIZED HEALTH CARE EDUCATION/TRAINING PROGRAM

## 2024-10-17 PROCEDURE — 3079F DIAST BP 80-89 MM HG: CPT | Performed by: STUDENT IN AN ORGANIZED HEALTH CARE EDUCATION/TRAINING PROGRAM

## 2024-10-17 PROCEDURE — G2211 COMPLEX E/M VISIT ADD ON: HCPCS | Performed by: STUDENT IN AN ORGANIZED HEALTH CARE EDUCATION/TRAINING PROGRAM

## 2024-10-17 RX ORDER — PRAVASTATIN SODIUM 40 MG/1
TABLET ORAL
Qty: 90 TABLET | Refills: 2 | Status: SHIPPED | OUTPATIENT
Start: 2024-10-17

## 2024-10-17 RX ORDER — GLIMEPIRIDE 2 MG/1
2 TABLET ORAL EVERY 24 HOURS
Qty: 90 TABLET | Refills: 2 | Status: SHIPPED | OUTPATIENT
Start: 2024-10-17

## 2024-10-17 RX ORDER — AMLODIPINE BESYLATE 2.5 MG/1
2.5 TABLET ORAL DAILY
Qty: 90 TABLET | Refills: 1 | Status: SHIPPED | OUTPATIENT
Start: 2024-10-17 | End: 2025-04-15

## 2024-10-17 RX ORDER — HYDROCHLOROTHIAZIDE 25 MG/1
25 TABLET ORAL DAILY
Qty: 90 TABLET | Refills: 2 | Status: SHIPPED | OUTPATIENT
Start: 2024-10-17

## 2024-10-17 RX ORDER — LISINOPRIL 40 MG/1
40 TABLET ORAL EVERY 24 HOURS
Qty: 90 TABLET | Refills: 2 | Status: SHIPPED | OUTPATIENT
Start: 2024-10-17

## 2024-10-17 ASSESSMENT — LIFESTYLE VARIABLES
AUDIT-C TOTAL SCORE: 2
HOW OFTEN DO YOU HAVE SIX OR MORE DRINKS ON ONE OCCASION: LESS THAN MONTHLY
HOW MANY STANDARD DRINKS CONTAINING ALCOHOL DO YOU HAVE ON A TYPICAL DAY: 1 OR 2
HOW OFTEN DO YOU HAVE A DRINK CONTAINING ALCOHOL: MONTHLY OR LESS
SKIP TO QUESTIONS 9-10: 0

## 2024-10-17 ASSESSMENT — ENCOUNTER SYMPTOMS
HEADACHES: 0
FEVER: 0
PALPITATIONS: 0
CHILLS: 0
SHORTNESS OF BREATH: 0

## 2024-10-17 ASSESSMENT — COLUMBIA-SUICIDE SEVERITY RATING SCALE - C-SSRS: 1. IN THE PAST MONTH, HAVE YOU WISHED YOU WERE DEAD OR WISHED YOU COULD GO TO SLEEP AND NOT WAKE UP?: NO

## 2024-10-17 NOTE — PROGRESS NOTES
"                                                                                                                 GENERAL PROGRESS NOTE    Chief Complaint   Patient presents with    Follow-up     [facility] is in office today to follow-up on labs and Diabetes        HPI  Jad Fisher is a 79 y.o. male presents today for follow up.    #HTN   BP today - 142/80  HCTZ 25, lisinopril 40  Home readings - 132-148/77-82, mostly running low 140's systolic      #DM   A1c - 6.0, 6.6, 7.1, 6.5, 7.1, 6.9, 6.4, 6.5, 6.7, 8.2, 7.8 6.5 6.5, 6.6   Current treatment:  - Glimepiride 2mg daily  Home blood sugars - not checking   No hypoglycemia ever   Previous diarrhea with both IR and ER versions of metformin   eGFR - 77, 69, 63, 63, 57, 70, 63 70, 78 88, 78  Cr - 1.01, 1.1, 1.2, 1.2, 1.3, 1.1, 1.2, 1.1, 1.0, 0.9, 1.0   Last microalbumin - 1/24   Last eye exam - 10/23    Last foot exam - 1/24    Vital signs   /80   Pulse 65   Ht 1.702 m (5' 7\")   Wt 75.8 kg (167 lb)   SpO2 98%   BMI 26.16 kg/m²      Current Outpatient Medications   Medication Sig Dispense Refill    marisan-izaiah-ALA-gym-ginseng-tea 100 mg-100 mcg- 100 mg-100 mg capsule daily      glimepiride (Amaryl) 2 mg tablet Take 1 tablet (2 mg) by mouth once every 24 hours. 90 tablet 2    hydroCHLOROthiazide (HYDRODiuril) 25 mg tablet Take 1 tablet (25 mg) by mouth once daily. 90 tablet 2    lisinopril 40 mg tablet Take 1 tablet (40 mg) by mouth once every 24 hours. 90 tablet 2    pravastatin (Pravachol) 40 mg tablet 1 tab(s) 90 tablet 2     No current facility-administered medications for this visit.       Review of Systems   Constitutional:  Negative for chills and fever.   Eyes:  Negative for visual disturbance.   Respiratory:  Negative for shortness of breath.    Cardiovascular:  Negative for chest pain, palpitations and leg swelling.   Neurological:  Negative for headaches.        Physical Exam  Constitutional:       Appearance: Normal appearance.   Cardiovascular:    "   Rate and Rhythm: Normal rate and regular rhythm.      Heart sounds: Normal heart sounds. No murmur heard.  Pulmonary:      Effort: Pulmonary effort is normal.      Breath sounds: Normal breath sounds. No wheezing, rhonchi or rales.   Musculoskeletal:      Right lower leg: No edema.      Left lower leg: No edema.   Neurological:      Mental Status: He is alert.         Assessment/Plan   1. Essential hypertension (Primary)  Needs better control. Elevated home readings and in office. Will add amlodipine 2.5mg daily. Continue home monitoring. Follow up 1-2 mos for recheck BP. Medication indications, use and potential side effects discussed in detail.  - amLODIPine (Norvasc) 2.5 mg tablet; Take 1 tablet (2.5 mg) by mouth once daily.  Dispense: 90 tablet; Refill: 1  - hydroCHLOROthiazide (HYDRODiuril) 25 mg tablet; Take 1 tablet (25 mg) by mouth once daily.  Dispense: 90 tablet; Refill: 2  - lisinopril 40 mg tablet; Take 1 tablet (40 mg) by mouth once every 24 hours.  Dispense: 90 tablet; Refill: 2    2. High cholesterol  - pravastatin (Pravachol) 40 mg tablet; 1 tab(s)  Dispense: 90 tablet; Refill: 2    3. Type 2 diabetes mellitus without complication, without long-term current use of insulin (Multi)  Controlled  Continue current medication regimen.   Patient is on ACEi, and statin  Lipid panel checked in last year  Foot exam UTD  Routine diabetic retinopathy screening up to date per pt - need report, he cannot remember who he sees but will find out  Continue dietary efforts and physical activity  - glimepiride (Amaryl) 2 mg tablet; Take 1 tablet (2 mg) by mouth once every 24 hours.  Dispense: 90 tablet; Refill: 2      Dolores Alexis MD  10/17/24  8:13 AM

## 2024-11-20 ASSESSMENT — ENCOUNTER SYMPTOMS
SHORTNESS OF BREATH: 0
BLURRED VISION: 0
PALPITATIONS: 0
HEADACHES: 0
ORTHOPNEA: 0
HYPERTENSION: 1
NECK PAIN: 0
SWEATS: 0
PND: 0

## 2024-11-21 ENCOUNTER — TELEPHONE (OUTPATIENT)
Dept: PRIMARY CARE | Facility: CLINIC | Age: 79
End: 2024-11-21

## 2024-11-21 ENCOUNTER — APPOINTMENT (OUTPATIENT)
Dept: PRIMARY CARE | Facility: CLINIC | Age: 79
End: 2024-11-21
Payer: COMMERCIAL

## 2024-11-21 VITALS
SYSTOLIC BLOOD PRESSURE: 144 MMHG | OXYGEN SATURATION: 96 % | WEIGHT: 164 LBS | DIASTOLIC BLOOD PRESSURE: 84 MMHG | HEIGHT: 67 IN | BODY MASS INDEX: 25.74 KG/M2 | HEART RATE: 78 BPM

## 2024-11-21 DIAGNOSIS — I10 BENIGN ESSENTIAL HTN: Primary | ICD-10-CM

## 2024-11-21 DIAGNOSIS — I10 ESSENTIAL HYPERTENSION: ICD-10-CM

## 2024-11-21 DIAGNOSIS — R42 DIZZINESS: ICD-10-CM

## 2024-11-21 PROBLEM — F43.21 GRIEF REACTION: Status: RESOLVED | Noted: 2020-07-23 | Resolved: 2024-11-21

## 2024-11-21 PROBLEM — F43.20 GRIEF REACTION: Status: RESOLVED | Noted: 2020-07-23 | Resolved: 2024-11-21

## 2024-11-21 PROCEDURE — 3079F DIAST BP 80-89 MM HG: CPT | Performed by: STUDENT IN AN ORGANIZED HEALTH CARE EDUCATION/TRAINING PROGRAM

## 2024-11-21 PROCEDURE — 1126F AMNT PAIN NOTED NONE PRSNT: CPT | Performed by: STUDENT IN AN ORGANIZED HEALTH CARE EDUCATION/TRAINING PROGRAM

## 2024-11-21 PROCEDURE — 1159F MED LIST DOCD IN RCRD: CPT | Performed by: STUDENT IN AN ORGANIZED HEALTH CARE EDUCATION/TRAINING PROGRAM

## 2024-11-21 PROCEDURE — 3077F SYST BP >= 140 MM HG: CPT | Performed by: STUDENT IN AN ORGANIZED HEALTH CARE EDUCATION/TRAINING PROGRAM

## 2024-11-21 PROCEDURE — 99214 OFFICE O/P EST MOD 30 MIN: CPT | Performed by: STUDENT IN AN ORGANIZED HEALTH CARE EDUCATION/TRAINING PROGRAM

## 2024-11-21 PROCEDURE — 1123F ACP DISCUSS/DSCN MKR DOCD: CPT | Performed by: STUDENT IN AN ORGANIZED HEALTH CARE EDUCATION/TRAINING PROGRAM

## 2024-11-21 ASSESSMENT — PATIENT HEALTH QUESTIONNAIRE - PHQ9
SUM OF ALL RESPONSES TO PHQ9 QUESTIONS 1 AND 2: 0
1. LITTLE INTEREST OR PLEASURE IN DOING THINGS: NOT AT ALL
2. FEELING DOWN, DEPRESSED OR HOPELESS: NOT AT ALL

## 2024-11-21 ASSESSMENT — PAIN SCALES - GENERAL: PAINLEVEL_OUTOF10: 0-NO PAIN

## 2024-11-21 ASSESSMENT — COLUMBIA-SUICIDE SEVERITY RATING SCALE - C-SSRS: 1. IN THE PAST MONTH, HAVE YOU WISHED YOU WERE DEAD OR WISHED YOU COULD GO TO SLEEP AND NOT WAKE UP?: NO

## 2024-11-21 NOTE — PROGRESS NOTES
"                                                                                                                 GENERAL PROGRESS NOTE    Chief Complaint   Patient presents with    Hypertension       HPI  Jad Fisher is a 79 y.o. male that presents today for HTN check.     #HTN   BP today - 156/84, repeat 144/84  HCTZ 25, lisinopril 40mg, amlodipine 2.5mg (added 10/2024)  Home readings - 116-144/64-78. Has only had one value over 140 systolic since last visit.    Complains of occas dizziness. Usually if stands too quickly. Sometimes with exertion feels tired and has to stop and take a rest. Did have flu and COVID vaccine around the time was feeling this.   Over last week has been better and not had any episodes.     Vital signs   /84   Pulse 78   Ht 1.702 m (5' 7\")   Wt 74.4 kg (164 lb)   SpO2 96%   BMI 25.69 kg/m²      Current Outpatient Medications   Medication Sig Dispense Refill    amLODIPine (Norvasc) 2.5 mg tablet Take 1 tablet (2.5 mg) by mouth once daily. 90 tablet 1    cinn-chrom-ALA-gym-ginseng-tea 100 mg-100 mcg- 100 mg-100 mg capsule daily      glimepiride (Amaryl) 2 mg tablet Take 1 tablet (2 mg) by mouth once every 24 hours. 90 tablet 2    hydroCHLOROthiazide (HYDRODiuril) 25 mg tablet Take 1 tablet (25 mg) by mouth once daily. 90 tablet 2    lisinopril 40 mg tablet Take 1 tablet (40 mg) by mouth once every 24 hours. 90 tablet 2    pravastatin (Pravachol) 40 mg tablet 1 tab(s) 90 tablet 2     No current facility-administered medications for this visit.       Review of Systems   Constitutional:  Negative for chills and fever.   Respiratory:  Negative for shortness of breath.    Cardiovascular:  Negative for chest pain and palpitations.   Musculoskeletal:  Negative for neck pain.   Neurological:  Negative for headaches.        Physical Exam  Constitutional:       Appearance: Normal appearance.   Eyes:      Extraocular Movements: Extraocular movements intact.      Right eye: Normal " extraocular motion and no nystagmus.      Left eye: Normal extraocular motion and no nystagmus.      Pupils: Pupils are equal, round, and reactive to light.   Cardiovascular:      Rate and Rhythm: Normal rate and regular rhythm.      Heart sounds: Normal heart sounds. No murmur heard.  Pulmonary:      Effort: Pulmonary effort is normal.      Breath sounds: Normal breath sounds. No wheezing, rhonchi or rales.   Musculoskeletal:      Right lower leg: No edema.      Left lower leg: No edema.   Neurological:      General: No focal deficit present.      Mental Status: He is alert.      Cranial Nerves: No cranial nerve deficit.      Coordination: Coordination normal.         Assessment/Plan   1. Benign essential HTN (Primary)  Improved control with additional of amlodipine with in office blood pressure elevation today. William keep medication same given normal home readings. He will continue home monitoring and is asked to reach out with persistently elevated readings.     2. Dizziness  Suspected related to his vaccines her received before onset. Now resolved. Will monitor for recurrence. Reminded to stay well hydrated.         Dolores Alexis MD  11/21/24  11:16 AM    Answers submitted by the patient for this visit:  High Blood Pressure Questionnaire (Submitted on 11/20/2024)  Chief Complaint: Hypertension  Chronicity: chronic  Onset: more than 1 year ago  Progression since onset: waxing and waning  Condition status: controlled  anxiety: No  blurred vision: No  malaise/fatigue: No  orthopnea: No  peripheral edema: No  PND: No  sweats: No  Agents associated with hypertension: no associated agents  CAD risks: diabetes mellitus, dyslipidemia  Compliance problems: no compliance problems

## 2024-12-03 ASSESSMENT — ENCOUNTER SYMPTOMS
HEADACHES: 0
SHORTNESS OF BREATH: 0
NECK PAIN: 0
PALPITATIONS: 0
CHILLS: 0
FEVER: 0

## 2024-12-20 ENCOUNTER — PATIENT MESSAGE (OUTPATIENT)
Dept: PRIMARY CARE | Facility: CLINIC | Age: 79
End: 2024-12-20
Payer: MEDICARE

## 2025-02-28 LAB
ANION GAP SERPL CALCULATED.4IONS-SCNC: 10 MMOL/L (CALC) (ref 7–17)
BUN SERPL-MCNC: 20 MG/DL (ref 7–25)
BUN/CREAT SERPL: ABNORMAL (CALC) (ref 6–22)
CALCIUM SERPL-MCNC: 9.5 MG/DL (ref 8.6–10.3)
CHLORIDE SERPL-SCNC: 104 MMOL/L (ref 98–110)
CO2 SERPL-SCNC: 26 MMOL/L (ref 20–32)
CREAT SERPL-MCNC: 1.04 MG/DL (ref 0.7–1.28)
EGFRCR SERPLBLD CKD-EPI 2021: 73 ML/MIN/1.73M2
GLUCOSE SERPL-MCNC: 189 MG/DL (ref 65–139)
POTASSIUM SERPL-SCNC: 4.1 MMOL/L (ref 3.5–5.3)
SODIUM SERPL-SCNC: 140 MMOL/L (ref 135–146)

## 2025-03-05 ENCOUNTER — TELEPHONE (OUTPATIENT)
Dept: PRIMARY CARE | Facility: CLINIC | Age: 80
End: 2025-03-05

## 2025-03-05 ENCOUNTER — APPOINTMENT (OUTPATIENT)
Dept: PRIMARY CARE | Facility: CLINIC | Age: 80
End: 2025-03-05
Payer: MEDICARE

## 2025-03-05 VITALS
DIASTOLIC BLOOD PRESSURE: 76 MMHG | OXYGEN SATURATION: 96 % | SYSTOLIC BLOOD PRESSURE: 158 MMHG | HEIGHT: 67 IN | WEIGHT: 175 LBS | HEART RATE: 73 BPM | BODY MASS INDEX: 27.47 KG/M2

## 2025-03-05 DIAGNOSIS — I10 BENIGN ESSENTIAL HTN: Primary | ICD-10-CM

## 2025-03-05 DIAGNOSIS — E11.9 TYPE 2 DIABETES MELLITUS WITHOUT COMPLICATION, WITHOUT LONG-TERM CURRENT USE OF INSULIN: ICD-10-CM

## 2025-03-05 DIAGNOSIS — E78.5 HYPERLIPIDEMIA, UNSPECIFIED HYPERLIPIDEMIA TYPE: ICD-10-CM

## 2025-03-05 DIAGNOSIS — I10 BENIGN ESSENTIAL HTN: ICD-10-CM

## 2025-03-05 PROCEDURE — 1126F AMNT PAIN NOTED NONE PRSNT: CPT | Performed by: STUDENT IN AN ORGANIZED HEALTH CARE EDUCATION/TRAINING PROGRAM

## 2025-03-05 PROCEDURE — 3078F DIAST BP <80 MM HG: CPT | Performed by: STUDENT IN AN ORGANIZED HEALTH CARE EDUCATION/TRAINING PROGRAM

## 2025-03-05 PROCEDURE — 3077F SYST BP >= 140 MM HG: CPT | Performed by: STUDENT IN AN ORGANIZED HEALTH CARE EDUCATION/TRAINING PROGRAM

## 2025-03-05 PROCEDURE — 1123F ACP DISCUSS/DSCN MKR DOCD: CPT | Performed by: STUDENT IN AN ORGANIZED HEALTH CARE EDUCATION/TRAINING PROGRAM

## 2025-03-05 PROCEDURE — G2211 COMPLEX E/M VISIT ADD ON: HCPCS | Performed by: STUDENT IN AN ORGANIZED HEALTH CARE EDUCATION/TRAINING PROGRAM

## 2025-03-05 PROCEDURE — 99214 OFFICE O/P EST MOD 30 MIN: CPT | Performed by: STUDENT IN AN ORGANIZED HEALTH CARE EDUCATION/TRAINING PROGRAM

## 2025-03-05 PROCEDURE — 1036F TOBACCO NON-USER: CPT | Performed by: STUDENT IN AN ORGANIZED HEALTH CARE EDUCATION/TRAINING PROGRAM

## 2025-03-05 RX ORDER — AMLODIPINE BESYLATE 5 MG/1
5 TABLET ORAL DAILY
Qty: 90 TABLET | Refills: 1 | Status: SHIPPED | OUTPATIENT
Start: 2025-03-05 | End: 2025-09-01

## 2025-03-05 ASSESSMENT — ENCOUNTER SYMPTOMS
SHORTNESS OF BREATH: 0
FEVER: 0
CHILLS: 0
HEADACHES: 0
PALPITATIONS: 0

## 2025-03-05 ASSESSMENT — PAIN SCALES - GENERAL: PAINLEVEL_OUTOF10: 0-NO PAIN

## 2025-03-05 NOTE — PROGRESS NOTES
"                                                                                                                 GENERAL PROGRESS NOTE    Chief Complaint   Patient presents with    Hypertension       HPI  Jad Fisher is a 80 y.o. male that presents for HTN follow up.     #HTN   BP today - 158/76, repeat by me 148/78  HCTZ 25, lisinopril 40mg, amlodipine 2.5mg   Home readings -135-145/65-75  Checking blood pressure every couple of days. Majority of readings in lower 140's  Tolerating medication without side effects. Following mostly healthy, little less with recent holidays and birthday parties.     Vital signs   /76 (BP Location: Left arm, Patient Position: Sitting, BP Cuff Size: Adult)   Pulse 73   Ht 1.702 m (5' 7\")   Wt 79.4 kg (175 lb)   SpO2 96%   BMI 27.41 kg/m²      Current Outpatient Medications   Medication Sig Dispense Refill    amLODIPine (Norvasc) 2.5 mg tablet Take 1 tablet (2.5 mg) by mouth once daily. 90 tablet 1    cinn-chrom-ALA-gym-ginseng-tea 100 mg-100 mcg- 100 mg-100 mg capsule daily      glimepiride (Amaryl) 2 mg tablet Take 1 tablet (2 mg) by mouth once every 24 hours. 90 tablet 2    hydroCHLOROthiazide (HYDRODiuril) 25 mg tablet Take 1 tablet (25 mg) by mouth once daily. 90 tablet 2    lisinopril 40 mg tablet Take 1 tablet (40 mg) by mouth once every 24 hours. 90 tablet 2    pravastatin (Pravachol) 40 mg tablet 1 tab(s) 90 tablet 2     No current facility-administered medications for this visit.       Review of Systems   Constitutional:  Negative for chills and fever.   Eyes:  Negative for visual disturbance.   Respiratory:  Negative for shortness of breath.    Cardiovascular:  Negative for chest pain, palpitations and leg swelling.   Neurological:  Negative for headaches.        Physical Exam  Constitutional:       Appearance: Normal appearance.   Cardiovascular:      Rate and Rhythm: Normal rate and regular rhythm.      Heart sounds: Normal heart sounds. No murmur " heard.  Pulmonary:      Effort: Pulmonary effort is normal.      Breath sounds: Normal breath sounds. No wheezing, rhonchi or rales.   Musculoskeletal:      Right lower leg: No edema.      Left lower leg: No edema.   Neurological:      Mental Status: He is alert.         Assessment & Plan  Benign essential HTN  Improved but BP still not at goal.   Increase amlodipine to 5 mg daily. Continue hydrochlorothiazide, lisinopril same way.   Labs reviewed and discussed. Stable renal function  Reviewed dietary efforts and physical activity    Return 3 mos for CPE    Orders:    amLODIPine (Norvasc) 5 mg tablet; Take 1 tablet (5 mg) by mouth once daily.          Dolores Alexis MD  03/05/25  8:50 AM

## 2025-03-05 NOTE — ASSESSMENT & PLAN NOTE
Improved but BP still not at goal.   Increase amlodipine to 5 mg daily. Continue hydrochlorothiazide, lisinopril same way.   Labs reviewed and discussed. Stable renal function  Reviewed dietary efforts and physical activity    Return 3 mos for CPE    Orders:    amLODIPine (Norvasc) 5 mg tablet; Take 1 tablet (5 mg) by mouth once daily.

## 2025-03-12 ENCOUNTER — APPOINTMENT (OUTPATIENT)
Dept: AUDIOLOGY | Facility: CLINIC | Age: 80
End: 2025-03-12
Payer: MEDICARE

## 2025-03-12 DIAGNOSIS — H93.13 TINNITUS OF BOTH EARS: Primary | ICD-10-CM

## 2025-03-12 DIAGNOSIS — H90.3 SENSORINEURAL HEARING LOSS (SNHL) OF BOTH EARS: Primary | ICD-10-CM

## 2025-03-12 DIAGNOSIS — H90.3 SENSORINEURAL HEARING LOSS (SNHL) OF BOTH EARS: ICD-10-CM

## 2025-03-12 PROCEDURE — HRANC PR HEARING AID NO CHARGE: Performed by: AUDIOLOGIST

## 2025-03-12 PROCEDURE — 92557 COMPREHENSIVE HEARING TEST: CPT | Performed by: AUDIOLOGIST

## 2025-03-12 PROCEDURE — 92567 TYMPANOMETRY: CPT | Performed by: AUDIOLOGIST

## 2025-03-12 NOTE — PROGRESS NOTES
HEARING AID CHECK     RIGHT: PHONAK AUDEO M70 R RECHARGEABLE [place] WITH #1 M  AND LARGE VENTED DOME WITH TRIMMED RETENTION TAIL  S.N.: [id number]  LEFT: PHONAK AUDEO M70 R RECHARGEABLE [place] WITH #0 M  AND LARGE VENTED DOME WITH TRIMMED RETENTION TAIL  S.N.: [id number]  WARRANTY EXPIRATION: 11/11/2022     OTOSCOPY REVEALED CLEAR EAR CANALS AU.      The patient is being seen today for a hearing evaluation and hearing aid check.  He has not been in for a hearing aid check in a few years.  Otoscopy revealed clear ear canals bilaterally.  Tested his hearing and noted a decrease in hearing compared to his previous audiogram.  Put the new hearing thresholds in the hearing aid software.  Cleaned and dehumidified both hearing aids and vacuumed the microphones.  Replaced the wax guards, retention tails, and domes.  The post cleaning listening check revealed good sound quality in both hearing aids.  Connected the hearing aids to the software and recalculated the fitting to accommodate any change in hearing.  The patient reported that he could hear well after today's cleaning and adjustments.  Re paired his hearing aid to his cell phone for streaming, and to the bMenu josue.  Gave him replacement domes.  Briefly discussed new hearing aid technology.  This is the last no charge appointment.  Recall in one year or sooner if needed.      Appointment time:  9:30-10:00

## 2025-03-17 NOTE — PROGRESS NOTES
AUDIOLOGY ADULT AUDIOMETRIC EVALUATION    Name:  Jad Fisher  :  1945  Age:  80 y.o.  Date of Evaluation:  2025    Reason for visit: Mr. Fisher is seen in the clinic today for an audiologic evaluation.     HISTORY  The patient has a history of bilateral sensorineural hearing loss and wears binaural hearing aids.  Intermittent bilateral tinnitus was reported.    EVALUATION  See scanned audiogram: “Media” > “Audiology Report”.      RESULTS  Otoscopic Evaluation:  Right Ear: clear ear canal  Left Ear: clear ear canal    Immittance Measures:  Tympanometry:  Right Ear: Type A, normal tympanic membrane mobility with normal middle ear pressure   Left Ear: Type A, normal tympanic membrane mobility with normal middle ear pressure     Acoustic Reflexes:  Ipsilateral Right Ear: did not evaluate   Ipsilateral Left Ear: did not evaluate   Contralateral Right Ear: did not evaluate  Contralateral Left Ear: did not evaluate    Distortion Product Otoacoustic Emissions (DPOAEs):  Right Ear: did not evaluate   Left Ear: did not evaluate     Audiometry:  Test Technique and Reliability:   Standard audiometry via supra-aural headphones. Reliability is good.    Pure tone air and bone conduction audiometry:  Right Ear: mild sloping to severe sensorineural hearing loss at 750-8000 Hz  Left Ear: mild sloping to severe sensorineural hearing loss at 8098-2431 Hz    Speech Audiometry (Word Recognition Scores):   Right Ear: Good, 84% in quiet at an elevated presentation level   Left Ear: Excellent, 96% in quiet at an elevated presentation level     IMPRESSIONS  Results of today's audiometric evaluation revealed a bilateral sensorineural hearing loss.  Results showed slight progression compared with the previous hearing evaluation from 2020.  Results of tympanometry testing indicated normal middle ear function in both ears.     RECOMMENDATIONS  - Annual audiologic evaluation, sooner if an acute change is noted.  -  Continued hearing aid use.  - Follow-up with audiology annually for routine hearing aid maintenance, sooner if questions/problems arise.    PATIENT EDUCATION  Discussed results, impressions and recommendations with the patient. Questions were addressed and the patient was encouraged to contact our office should concerns arise.    Time for this encounter: 9:00-9:30    Pooja Leon M.A., CCC-A   Licensed Audiologist

## 2025-03-20 ENCOUNTER — TELEPHONE (OUTPATIENT)
Dept: AUDIOLOGY | Facility: CLINIC | Age: 80
End: 2025-03-20
Payer: MEDICARE

## 2025-05-13 DIAGNOSIS — I10 BENIGN ESSENTIAL HTN: ICD-10-CM

## 2025-05-13 DIAGNOSIS — E78.5 HYPERLIPIDEMIA, UNSPECIFIED HYPERLIPIDEMIA TYPE: ICD-10-CM

## 2025-05-13 DIAGNOSIS — E11.9 TYPE 2 DIABETES MELLITUS WITHOUT COMPLICATION, WITHOUT LONG-TERM CURRENT USE OF INSULIN: ICD-10-CM

## 2025-06-07 LAB
ALBUMIN SERPL-MCNC: 4.4 G/DL (ref 3.6–5.1)
ALP SERPL-CCNC: 76 U/L (ref 35–144)
ALT SERPL-CCNC: 21 U/L (ref 9–46)
ANION GAP SERPL CALCULATED.4IONS-SCNC: 7 MMOL/L (CALC) (ref 7–17)
AST SERPL-CCNC: 20 U/L (ref 10–35)
BASOPHILS # BLD AUTO: 32 CELLS/UL (ref 0–200)
BASOPHILS NFR BLD AUTO: 0.6 %
BILIRUB SERPL-MCNC: 0.5 MG/DL (ref 0.2–1.2)
BUN SERPL-MCNC: 25 MG/DL (ref 7–25)
CALCIUM SERPL-MCNC: 8.9 MG/DL (ref 8.6–10.3)
CHLORIDE SERPL-SCNC: 107 MMOL/L (ref 98–110)
CHOLEST SERPL-MCNC: 163 MG/DL
CHOLEST/HDLC SERPL: 2.9 (CALC)
CO2 SERPL-SCNC: 27 MMOL/L (ref 20–32)
CREAT SERPL-MCNC: 1.15 MG/DL (ref 0.7–1.28)
EGFRCR SERPLBLD CKD-EPI 2021: 65 ML/MIN/1.73M2
EOSINOPHIL # BLD AUTO: 90 CELLS/UL (ref 15–500)
EOSINOPHIL NFR BLD AUTO: 1.7 %
ERYTHROCYTE [DISTWIDTH] IN BLOOD BY AUTOMATED COUNT: 12.6 % (ref 11–15)
EST. AVERAGE GLUCOSE BLD GHB EST-MCNC: 177 MG/DL
EST. AVERAGE GLUCOSE BLD GHB EST-SCNC: 9.8 MMOL/L
GLUCOSE SERPL-MCNC: 146 MG/DL (ref 65–99)
HBA1C MFR BLD: 7.8 %
HCT VFR BLD AUTO: 41.7 % (ref 38.5–50)
HDLC SERPL-MCNC: 56 MG/DL
HGB BLD-MCNC: 13.7 G/DL (ref 13.2–17.1)
LDLC SERPL CALC-MCNC: 89 MG/DL (CALC)
LYMPHOCYTES # BLD AUTO: 1680 CELLS/UL (ref 850–3900)
LYMPHOCYTES NFR BLD AUTO: 31.7 %
MCH RBC QN AUTO: 31.1 PG (ref 27–33)
MCHC RBC AUTO-ENTMCNC: 32.9 G/DL (ref 32–36)
MCV RBC AUTO: 94.8 FL (ref 80–100)
MONOCYTES # BLD AUTO: 408 CELLS/UL (ref 200–950)
MONOCYTES NFR BLD AUTO: 7.7 %
NEUTROPHILS # BLD AUTO: 3090 CELLS/UL (ref 1500–7800)
NEUTROPHILS NFR BLD AUTO: 58.3 %
NONHDLC SERPL-MCNC: 107 MG/DL (CALC)
PLATELET # BLD AUTO: 254 THOUSAND/UL (ref 140–400)
PMV BLD REES-ECKER: 10.7 FL (ref 7.5–12.5)
POTASSIUM SERPL-SCNC: 4.3 MMOL/L (ref 3.5–5.3)
PROT SERPL-MCNC: 6.3 G/DL (ref 6.1–8.1)
RBC # BLD AUTO: 4.4 MILLION/UL (ref 4.2–5.8)
SODIUM SERPL-SCNC: 141 MMOL/L (ref 135–146)
TRIGL SERPL-MCNC: 88 MG/DL
WBC # BLD AUTO: 5.3 THOUSAND/UL (ref 3.8–10.8)

## 2025-06-13 ENCOUNTER — APPOINTMENT (OUTPATIENT)
Dept: PRIMARY CARE | Facility: CLINIC | Age: 80
End: 2025-06-13
Payer: MEDICARE

## 2025-06-13 ENCOUNTER — TELEPHONE (OUTPATIENT)
Dept: PRIMARY CARE | Facility: CLINIC | Age: 80
End: 2025-06-13

## 2025-06-13 VITALS
BODY MASS INDEX: 26.68 KG/M2 | OXYGEN SATURATION: 96 % | HEIGHT: 67 IN | DIASTOLIC BLOOD PRESSURE: 80 MMHG | TEMPERATURE: 98 F | HEART RATE: 77 BPM | WEIGHT: 170 LBS | SYSTOLIC BLOOD PRESSURE: 136 MMHG

## 2025-06-13 DIAGNOSIS — R09.A2 GLOBUS SENSATION: ICD-10-CM

## 2025-06-13 DIAGNOSIS — E78.5 HYPERLIPIDEMIA, UNSPECIFIED HYPERLIPIDEMIA TYPE: ICD-10-CM

## 2025-06-13 DIAGNOSIS — E11.65 TYPE 2 DIABETES MELLITUS WITH HYPERGLYCEMIA, WITHOUT LONG-TERM CURRENT USE OF INSULIN: ICD-10-CM

## 2025-06-13 DIAGNOSIS — Z00.00 ROUTINE GENERAL MEDICAL EXAMINATION AT HEALTH CARE FACILITY: Primary | ICD-10-CM

## 2025-06-13 DIAGNOSIS — I10 BENIGN ESSENTIAL HTN: ICD-10-CM

## 2025-06-13 PROBLEM — C61 ADENOCARCINOMA OF PROSTATE (MULTI): Status: RESOLVED | Noted: 2019-08-06 | Resolved: 2025-06-13

## 2025-06-13 PROBLEM — R42 DIZZINESS: Status: RESOLVED | Noted: 2020-09-03 | Resolved: 2025-06-13

## 2025-06-13 RX ORDER — GLUCOSAM/CHONDRO/HERB 149/HYAL 750-100 MG
TABLET ORAL
COMMUNITY

## 2025-06-13 RX ORDER — AMLODIPINE BESYLATE 5 MG/1
5 TABLET ORAL DAILY
Qty: 90 TABLET | Refills: 2 | Status: SHIPPED | OUTPATIENT
Start: 2025-06-13 | End: 2026-03-10

## 2025-06-13 ASSESSMENT — ENCOUNTER SYMPTOMS
CHILLS: 0
LIGHT-HEADEDNESS: 0
PALPITATIONS: 0
DYSPHORIC MOOD: 0
FEVER: 0
ABDOMINAL PAIN: 0
HEADACHES: 0
JOINT SWELLING: 0
LOSS OF SENSATION IN FEET: 0
WHEEZING: 0
DIARRHEA: 0
DIZZINESS: 0
TROUBLE SWALLOWING: 0
BLOOD IN STOOL: 0
DIFFICULTY URINATING: 0
SHORTNESS OF BREATH: 0
NERVOUS/ANXIOUS: 0
COUGH: 0
DEPRESSION: 0
CONSTIPATION: 0
ARTHRALGIAS: 0
BACK PAIN: 0
OCCASIONAL FEELINGS OF UNSTEADINESS: 0

## 2025-06-13 ASSESSMENT — ACTIVITIES OF DAILY LIVING (ADL)
DOING_HOUSEWORK: INDEPENDENT
MANAGING_FINANCES: INDEPENDENT
TAKING_MEDICATION: INDEPENDENT
BATHING: INDEPENDENT
GROCERY_SHOPPING: INDEPENDENT
DRESSING: INDEPENDENT

## 2025-06-13 ASSESSMENT — PAIN SCALES - GENERAL: PAINLEVEL_OUTOF10: 0-NO PAIN

## 2025-06-13 NOTE — PROGRESS NOTES
Subjective   Reason for Visit: Jad Fisher is an 80 y.o. male here for a Medicare Wellness visit.          Reviewed all medications by prescribing practitioner or clinical pharmacist (such as prescriptions, OTCs, herbal therapies and supplements) and documented in the medical record.    HPI  INTERVAL HX/CURRENT CONCERNS:   none. Gets very very nervous at our office visits.   Sensation of something stuck in throat off and on past several months. Has noticed more post nasal drainage especially at night. Takes a small dose of nyquil most nights. Uses Listerine daily and does not some burning with this at times. Never smoker. No difficulty swallowing solids or liquids.     CHRONIC CONDITIONS:  DM  HTN - HCTZ 25, lisinopril 40mg, amlodipine 5mg. Home readings normal 110's/137/60-70's. Highest systolic has been 137  HLD - Pravastatin 40mg, LDL 89  Hx prostate ca s/p brachytherapy 2017, under urology. Will be establishing with new urologist 7/2024 due to Dr. Miranda longterm    Supplements - MVI, cinnamon, fish oil    SPECIALISTS   Urology - Dr. Walker, will be starting with Dennis Robertson PA-C     #DM   A1c - 7.8, 6.6, 7.1, 6.5, 7.1, 6.9, 6.4, 6.5, 6.7, 8.2, 7.8 6.5 6.5, 6.6   Current treatment:  - Glimepiride 2mg daily  Home blood sugars - not checking regularly. Has checked in past week, and non-fasting is 130-140's  No hypoglycemia ever   Previous diarrhea with both IR and ER versions of metformin   eGFR - 65, 69, 63, 63, 57, 70, 63 70, 78 88, 78  Cr - 1.15, 1.1, 1.2, 1.2, 1.3, 1.1, 1.2, 1.1, 1.0, 0.9, 1.0   Last microalbumin - 1/24   Last eye exam -  Dr. Yates at [facility] [place] 5/2025   Last foot exam - today 6/13  Statin - Pravastatin 40 mg   ACEi/ARB - lisinopril 40 mg  Diet: Has been mowing 4 lawns and after mowing has been drinking Black Velvet and regular Coke. Typically drinks 1-2 drinks a week but lately had been having 4+ a week. Was also drinking non-sugar free tea which he usually drinks sugar  "free. Once saw labs with increased A1c stopped all of this.     Health Maintenance  Immunizations:     Tdap - 2016    Pneumococcal - PCV20 1/24    Shingles - Zostavax 2014, shingrix done x [facility] at [facility] pt -- he will check to verify    COVID - last dose 11/2023    Influenza - annually    RSV - discussed  Colonoscopy: 2018, now discontinued for age  Lung cancer screening: not indicated     Male Specific   PSA last done - hx prostate ca, under urology   AAA screen: never smoker    Patient Care Team:  Dolores Alexis MD as PCP - General (Family Medicine)  Dolores Alexis MD as PCP - O Medicare Advantage PCP     Review of Systems   Constitutional:  Negative for chills and fever.   HENT:  Negative for trouble swallowing.    Eyes:  Negative for visual disturbance.   Respiratory:  Negative for cough, shortness of breath and wheezing.    Cardiovascular:  Negative for chest pain and palpitations.   Gastrointestinal:  Negative for abdominal pain, blood in stool, constipation and diarrhea.   Genitourinary:  Negative for difficulty urinating, penile pain and testicular pain.   Musculoskeletal:  Negative for arthralgias, back pain and joint swelling.   Skin:  Negative for rash.   Neurological:  Negative for dizziness, light-headedness and headaches.   Psychiatric/Behavioral:  Negative for dysphoric mood. The patient is not nervous/anxious.        Objective   Vitals:  /74 (BP Location: Left arm, Patient Position: Sitting, BP Cuff Size: Adult)   Pulse 77   Temp 36.7 °C (98 °F) (Temporal)   Ht 1.702 m (5' 7\")   Wt 77.1 kg (170 lb)   SpO2 96%   BMI 26.63 kg/m²       Physical Exam  Vitals and nursing note reviewed.   Constitutional:       Appearance: Normal appearance.   HENT:      Head: Normocephalic and atraumatic.      Right Ear: Tympanic membrane, ear canal and external ear normal.      Left Ear: Tympanic membrane, ear canal and external ear normal.      Mouth/Throat:      Mouth: Mucous membranes are moist.      Pharynx: " Oropharynx is clear. Postnasal drip present.      Comments: +cobblestoning posterior oropharynx  Eyes:      Extraocular Movements: Extraocular movements intact.      Conjunctiva/sclera: Conjunctivae normal.      Pupils: Pupils are equal, round, and reactive to light.   Cardiovascular:      Rate and Rhythm: Normal rate and regular rhythm.      Heart sounds: Normal heart sounds.   Pulmonary:      Effort: Pulmonary effort is normal.      Breath sounds: Normal breath sounds. No wheezing, rhonchi or rales.   Abdominal:      General: Abdomen is flat.      Palpations: Abdomen is soft.      Tenderness: There is no abdominal tenderness.   Musculoskeletal:         General: Normal range of motion.      Cervical back: Normal range of motion and neck supple.   Skin:     General: Skin is warm and dry.   Neurological:      Mental Status: He is alert.   Psychiatric:         Mood and Affect: Mood normal.         Behavior: Behavior normal.       DIABETIC FOOT EXAM:  Skin changes - None  Pulses Present bilaterally to DP and PT  Nails - No fungal infections  Sensation in tact throughout with monifilament  No skin breaks between toes  No ulcers    Assessment & Plan  Routine general medical examination at health care facility  Well adult exam.  1. Age appropriate preventative measures reviewed.   2. Encouraged healthy diet and exercise.  3. Immunizations- Reviewed and discussed  4. Labs- Reviewed and discussed with patient  5. Medications- Reviewed    Orders:    1 Year Follow Up In Primary Care - Wellness Exam; Future    Benign essential HTN  Elevated on presentation, improved on repeat. Home readings at goal.   Continue medication same way, no change.   Labs reviewed and discussed. Stable renal function  Continue dietary efforts and physical activity      Orders:    amLODIPine (Norvasc) 5 mg tablet; Take 1 tablet (5 mg) by mouth once daily.    Globus sensation  Suspect related to PND. Start flonase daily. Recommend changing to alcohol  free mouthwash as well. Will update me in 4 weeks if any improvement. If not will trial PPI.       Hyperlipidemia, unspecified hyperlipidemia type  Lipid panel reviewed. Continue medication. Recommend healthy diet  that includes lean proteins, vegetables, fruits, and whole grains.  Limit saturated fats, excess sodium, and processed foods.  Limit sugary drinks and sweets.  Moderate exercise at least 30 minutes per day, 5 days per week.         Type 2 diabetes mellitus with hyperglycemia, without long-term current use of insulin  A1c not at goal with interval increase to 7.8. discussed increasing glimepiride to 4 mg daily. He had been consuming more sugar sweetened beverages on a regular basis since spring and this likely has contributed. He prefers to make dietary change first and if no significant improvement we will adjust meds.   Continue current medication regimen.   Patient is on ACEi/ARB, and statin  Lipid panel checked in last year  Foot exam and monofilament done   Routine diabetic retinopathy screening up to date   Discussed dietary efforts and physical activity

## 2025-06-13 NOTE — ASSESSMENT & PLAN NOTE
Elevated on presentation, improved on repeat. Home readings at goal.   Continue medication same way, no change.   Labs reviewed and discussed. Stable renal function  Continue dietary efforts and physical activity      Orders:    amLODIPine (Norvasc) 5 mg tablet; Take 1 tablet (5 mg) by mouth once daily.

## 2025-06-13 NOTE — PATIENT INSTRUCTIONS
Check with [facility] if you had shingles vaccine (Shingrix)    For your throat symptoms - start flonase daily. Change to an alcohol free mouthwash. Let me know in 4 weeks if no improvement.

## 2025-06-13 NOTE — ASSESSMENT & PLAN NOTE
A1c not at goal with interval increase to 7.8. discussed increasing glimepiride to 4 mg daily. He had been consuming more sugar sweetened beverages on a regular basis since spring and this likely has contributed. He prefers to make dietary change first and if no significant improvement we will adjust meds.   Continue current medication regimen.   Patient is on ACEi/ARB, and statin  Lipid panel checked in last year  Foot exam and monofilament done   Routine diabetic retinopathy screening up to date   Discussed dietary efforts and physical activity

## 2025-08-12 DIAGNOSIS — I10 BENIGN ESSENTIAL HTN: ICD-10-CM

## 2025-08-12 DIAGNOSIS — E11.65 TYPE 2 DIABETES MELLITUS WITH HYPERGLYCEMIA, WITHOUT LONG-TERM CURRENT USE OF INSULIN: ICD-10-CM

## 2025-08-29 DIAGNOSIS — I10 ESSENTIAL HYPERTENSION: ICD-10-CM

## 2025-08-29 DIAGNOSIS — E11.9 TYPE 2 DIABETES MELLITUS WITHOUT COMPLICATION, WITHOUT LONG-TERM CURRENT USE OF INSULIN: ICD-10-CM

## 2025-08-29 DIAGNOSIS — E78.00 HIGH CHOLESTEROL: ICD-10-CM

## 2025-09-02 RX ORDER — LISINOPRIL 40 MG/1
40 TABLET ORAL
Qty: 90 TABLET | Refills: 1 | Status: SHIPPED | OUTPATIENT
Start: 2025-09-02

## 2025-09-02 RX ORDER — GLIMEPIRIDE 2 MG/1
2 TABLET ORAL
Qty: 90 TABLET | Refills: 1 | Status: SHIPPED | OUTPATIENT
Start: 2025-09-02

## 2025-09-02 RX ORDER — PRAVASTATIN SODIUM 40 MG/1
40 TABLET ORAL
Qty: 90 TABLET | Refills: 1 | Status: SHIPPED | OUTPATIENT
Start: 2025-09-02

## 2025-09-02 RX ORDER — HYDROCHLOROTHIAZIDE 25 MG/1
25 TABLET ORAL
Qty: 90 TABLET | Refills: 1 | Status: SHIPPED | OUTPATIENT
Start: 2025-09-02

## 2025-09-03 ENCOUNTER — PATIENT MESSAGE (OUTPATIENT)
Dept: PRIMARY CARE | Facility: CLINIC | Age: 80
End: 2025-09-03
Payer: MEDICARE

## 2025-09-12 ENCOUNTER — APPOINTMENT (OUTPATIENT)
Dept: PRIMARY CARE | Facility: CLINIC | Age: 80
End: 2025-09-12
Payer: MEDICARE